# Patient Record
Sex: FEMALE | Race: WHITE | URBAN - METROPOLITAN AREA
[De-identification: names, ages, dates, MRNs, and addresses within clinical notes are randomized per-mention and may not be internally consistent; named-entity substitution may affect disease eponyms.]

---

## 2019-01-29 ENCOUNTER — DOCTOR'S OFFICE (OUTPATIENT)
Dept: URBAN - METROPOLITAN AREA CLINIC 136 | Facility: CLINIC | Age: 49
Setting detail: OPHTHALMOLOGY
End: 2019-01-29
Payer: COMMERCIAL

## 2019-01-29 ENCOUNTER — OPTICAL OFFICE (OUTPATIENT)
Dept: URBAN - METROPOLITAN AREA CLINIC 143 | Facility: CLINIC | Age: 49
Setting detail: OPHTHALMOLOGY
End: 2019-01-29
Payer: COMMERCIAL

## 2019-01-29 DIAGNOSIS — H52.223: ICD-10-CM

## 2019-01-29 DIAGNOSIS — H52.4: ICD-10-CM

## 2019-01-29 PROCEDURE — 92310 CONTACT LENS FITTING OU: CPT | Performed by: OPTOMETRIST

## 2019-01-29 PROCEDURE — V2760 SCRATCH RESISTANT COATING: HCPCS | Performed by: OPTOMETRIST

## 2019-01-29 PROCEDURE — V2020 VISION SVCS FRAMES PURCHASES: HCPCS | Performed by: OPTOMETRIST

## 2019-01-29 PROCEDURE — 92015 DETERMINE REFRACTIVE STATE: CPT | Performed by: OPTOMETRIST

## 2019-01-29 PROCEDURE — V2107 SPHEROCYLINDER 4.25D/12-2D: HCPCS | Performed by: OPTOMETRIST

## 2019-01-29 PROCEDURE — V2782 LENS, 1.54-1.65 P/1.60-1.79G: HCPCS | Performed by: OPTOMETRIST

## 2019-01-29 PROCEDURE — V2104 SPHEROCYLINDR 4.00D/2.12-4D: HCPCS | Performed by: OPTOMETRIST

## 2019-01-29 PROCEDURE — 92004 COMPRE OPH EXAM NEW PT 1/>: CPT | Performed by: OPTOMETRIST

## 2019-01-29 ASSESSMENT — REFRACTION_AUTOREFRACTION
OS_CYLINDER: -1.00
OD_CYLINDER: -3.25
OS_AXIS: 101
OD_SPHERE: -3.75
OS_SPHERE: -5.00
OD_AXIS: 017

## 2019-01-29 ASSESSMENT — REFRACTION_MANIFEST
OD_VA2: 20/
OS_VA1: 20/40+
OD_SPHERE: -3.25
OD_CYLINDER: -3.00
OD_VA3: 20/
OD_VA3: 20/
OU_VA: 20/30
OD_AXIS: 020
OS_CYLINDER: -0.50
OS_VA2: 20/
OS_AXIS: 180
OD_ADD: +2.00
OS_VA3: 20/
OU_VA: 20/
OS_VA3: 20/
OS_SPHERE: -5.00
OD_VA2: 20/
OS_ADD: +2.00
OS_VA2: 20/
OS_VA1: 20/
OD_VA1: 20/
OD_VA1: 20/25

## 2019-01-29 ASSESSMENT — VISUAL ACUITY
OD_BCVA: 20/50-2
OS_BCVA: 20/40-2

## 2019-01-29 ASSESSMENT — KERATOMETRY
OD_K1POWER_DIOPTERS: 44.00
OS_AXISANGLE_DEGREES: 062
OD_AXISANGLE_DEGREES: 093
METHOD_AUTO_MANUAL: AUTO
OS_K2POWER_DIOPTERS: 45.25
OD_K2POWER_DIOPTERS: 49.25
OS_K1POWER_DIOPTERS: 44.75

## 2019-01-29 ASSESSMENT — SPHEQUIV_DERIVED
OS_SPHEQUIV: -5.25
OS_SPHEQUIV: -5.5
OD_SPHEQUIV: -4.75
OD_SPHEQUIV: -5.375

## 2019-01-29 ASSESSMENT — AXIALLENGTH_DERIVED
OD_AL: 24.5777
OS_AL: 25.1874
OS_AL: 25.2989
OD_AL: 24.3163

## 2019-01-29 ASSESSMENT — CONFRONTATIONAL VISUAL FIELD TEST (CVF)
OS_FINDINGS: FULL
OD_FINDINGS: FULL

## 2019-01-29 ASSESSMENT — REFRACTION_CURRENTRX
OD_OVR_VA: 20/
OS_OVR_VA: 20/
OS_OVR_VA: 20/
OD_OVR_VA: 20/
OD_OVR_VA: 20/
OS_OVR_VA: 20/

## 2019-02-21 ENCOUNTER — DOCTOR'S OFFICE (OUTPATIENT)
Dept: URBAN - METROPOLITAN AREA CLINIC 136 | Facility: CLINIC | Age: 49
Setting detail: OPHTHALMOLOGY
End: 2019-02-21

## 2019-02-21 DIAGNOSIS — H52.223: ICD-10-CM

## 2019-02-21 PROCEDURE — CLFUP CONTACT LENS FOLLOW-UP: Performed by: OPTOMETRIST

## 2019-02-21 ASSESSMENT — REFRACTION_MANIFEST
OD_VA2: 20/
OS_VA1: 20/
OD_VA2: 20/
OD_VA1: 20/
OU_VA: 20/
OD_ADD: +2.00
OS_SPHERE: -5.00
OS_CYLINDER: -0.50
OD_VA3: 20/
OS_ADD: +2.00
OU_VA: 20/30
OS_VA2: 20/
OD_CYLINDER: -3.00
OS_AXIS: 180
OD_VA1: 20/25
OS_VA1: 20/40+
OD_SPHERE: -3.25
OS_VA2: 20/
OS_VA3: 20/
OS_VA3: 20/
OD_VA3: 20/
OD_AXIS: 020

## 2019-02-21 ASSESSMENT — REFRACTION_AUTOREFRACTION
OS_CYLINDER: -1.00
OD_SPHERE: -3.75
OD_CYLINDER: -3.25
OD_AXIS: 017
OS_AXIS: 101
OS_SPHERE: -5.00

## 2019-02-21 ASSESSMENT — SPHEQUIV_DERIVED
OD_SPHEQUIV: -5.375
OS_SPHEQUIV: -5.25
OS_SPHEQUIV: -5.5
OD_SPHEQUIV: -4.75

## 2019-02-21 ASSESSMENT — REFRACTION_CURRENTRX
OS_OVR_VA: 20/
OD_OVR_VA: 20/
OS_OVR_VA: 20/
OD_OVR_VA: 20/
OS_OVR_VA: 20/
OD_OVR_VA: 20/

## 2019-02-21 ASSESSMENT — VISUAL ACUITY
OS_BCVA: 20/40+2
OD_BCVA: 20/50+2

## 2019-02-21 ASSESSMENT — CONFRONTATIONAL VISUAL FIELD TEST (CVF)
OS_FINDINGS: FULL
OD_FINDINGS: FULL

## 2019-03-07 ENCOUNTER — OPTICAL OFFICE (OUTPATIENT)
Dept: URBAN - METROPOLITAN AREA CLINIC 143 | Facility: CLINIC | Age: 49
Setting detail: OPHTHALMOLOGY
End: 2019-03-07

## 2019-03-07 DIAGNOSIS — H52.13: ICD-10-CM

## 2019-03-07 PROCEDURE — S0500 DISPOS CONT LENS: HCPCS | Performed by: OPTOMETRIST

## 2019-10-19 ENCOUNTER — APPOINTMENT (EMERGENCY)
Dept: RADIOLOGY | Facility: HOSPITAL | Age: 49
End: 2019-10-19
Payer: OTHER MISCELLANEOUS

## 2019-10-19 ENCOUNTER — HOSPITAL ENCOUNTER (EMERGENCY)
Facility: HOSPITAL | Age: 49
Discharge: HOME | End: 2019-10-19
Attending: EMERGENCY MEDICINE | Admitting: EMERGENCY MEDICINE
Payer: OTHER MISCELLANEOUS

## 2019-10-19 VITALS
TEMPERATURE: 97.6 F | HEART RATE: 90 BPM | BODY MASS INDEX: 42.11 KG/M2 | SYSTOLIC BLOOD PRESSURE: 154 MMHG | DIASTOLIC BLOOD PRESSURE: 87 MMHG | OXYGEN SATURATION: 97 % | HEIGHT: 66 IN | WEIGHT: 262 LBS

## 2019-10-19 DIAGNOSIS — S46.212A BICEPS STRAIN, LEFT, INITIAL ENCOUNTER: Primary | ICD-10-CM

## 2019-10-19 PROCEDURE — 73070 X-RAY EXAM OF ELBOW: CPT

## 2019-10-19 PROCEDURE — 99284 EMERGENCY DEPT VISIT MOD MDM: CPT | Performed by: EMERGENCY MEDICINE

## 2019-10-19 PROCEDURE — 99283 EMERGENCY DEPT VISIT LOW MDM: CPT

## 2019-10-19 RX ORDER — ACETAMINOPHEN 325 MG/1
650 TABLET ORAL ONCE
Status: COMPLETED | OUTPATIENT
Start: 2019-10-19 | End: 2019-10-19

## 2019-10-19 RX ORDER — IBUPROFEN 600 MG/1
600 TABLET ORAL ONCE
Status: DISCONTINUED | OUTPATIENT
Start: 2019-10-19 | End: 2019-10-19

## 2019-10-19 RX ADMIN — ACETAMINOPHEN 650 MG: 325 TABLET ORAL at 18:25

## 2019-10-19 NOTE — ED PROVIDER NOTES
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 when the pt "took a hold of my arm ( left) and pulled herself up, then I felt instant pain in my elbow (left)"      Massimo Galeana is a 47y o  year old female presenting to the [facility] ED for left elbow pain  Patient patient was at work when a nursing home resident tried to pull herself up by pulling on the patient's flexed elbow  Patient had immediate discomfort at the lateral aspect of the elbow  Patient applied ice to the area but did not take any over-the-counter analgesia  Patient states she has a "heaviness" in her left upper extremity  No bony tenderness in the left forearm or left humerus  Patient denies fever, lightheadedness, chest pain, dyspnea, hemoptysis, LE pain/edema  History provided by:  Patient   used: No    Arm Pain   Location:  Left elbow  Quality:  Heaviness  Severity:  Mild  Onset quality:  Sudden  Timing:  Intermittent  Progression:  Partially resolved  Chronicity:  New  Ineffective treatments: Ice  Associated symptoms: no abdominal pain, no chest pain, no congestion, no cough, no diarrhea, no fever, no nausea, no rash, no rhinorrhea, no shortness of breath, no vomiting and no wheezing        None       Past Medical History:   Diagnosis Date    Diabetes mellitus ([facility] [address] )     Hyperlipidemia     Hypertension        Past Surgical History:   Procedure Laterality Date    EYE SURGERY      HIP RESURFACING      KNEE ARTHROPLASTY      SHOULDER ARTHROSCOPY W/ ROTATOR CUFF REPAIR         History reviewed  No pertinent family history  I have reviewed and agree with the history as documented      Social History     Tobacco Use    Smoking status: Former Smoker     Last attempt to quit: 10/19/2016     Years since quitting: 3 0    Smokeless tobacco: Never Used   Substance Use Topics    Alcohol use: Not Currently     Frequency: Never    Drug use: Never        Review of Systems   Constitutional: Negative for chills and fever  HENT: Negative for congestion and rhinorrhea  Eyes: Negative for photophobia and visual disturbance  Respiratory: Negative for cough, choking, chest tightness, shortness of breath and wheezing  Cardiovascular: Negative for chest pain and leg swelling  Gastrointestinal: Negative for abdominal distention, abdominal pain, constipation, diarrhea, nausea and vomiting  Endocrine: Negative for polyuria  Genitourinary: Negative for difficulty urinating, dysuria, flank pain and hematuria  Musculoskeletal: Positive for arthralgias  Negative for back pain  Skin: Negative for rash  Neurological: Negative for seizures, syncope and light-headedness  Psychiatric/Behavioral: Negative for behavioral problems and confusion  All other systems reviewed and are negative  Physical Exam  ED Triage Vitals [10/19/19 1755]   Temperature Pulse Resp Blood Pressure SpO2   97 6 °F (36 4 °C) 90 -- 154/87 97 %      Temp Source Heart Rate Source Patient Position - Orthostatic VS BP Location FiO2 (%)   Oral Monitor -- Right arm --      Pain Score       8             Orthostatic Vital Signs  Vitals:    10/19/19 1755   BP: 154/87   Pulse: 90       Physical Exam   Constitutional: She is oriented to person, place, and time  She appears well-developed and well-nourished  Non-toxic appearance  She does not appear ill  No distress  HENT:   Head: Normocephalic and atraumatic  Neck: Neck supple  Cardiovascular: Normal rate, regular rhythm and intact distal pulses  Pulses:       Radial pulses are 2+ on the right side, and 2+ on the left side  Pulmonary/Chest: Effort normal and breath sounds normal  No accessory muscle usage or stridor  No respiratory distress  She has no decreased breath sounds  She has no wheezes  She has no rhonchi  She has no rales  Abdominal: Soft  Bowel sounds are normal  She exhibits no distension   There is no tenderness  There is no rebound and no guarding  Musculoskeletal:        Left hand: Normal sensation noted  Normal strength noted  Right lower leg: She exhibits no tenderness and no edema  Left lower leg: She exhibits no tenderness and no edema  Median, ulnar, radial nerve function intact LUE   Neurological: She is alert and oriented to person, place, and time  Skin: Capillary refill takes less than 2 seconds  No rash noted  She is not diaphoretic  Psychiatric: She has a normal mood and affect  Her behavior is normal    Nursing note and vitals reviewed  ED Medications  Medications   acetaminophen (TYLENOL) tablet 650 mg (650 mg Oral Given 10/19/19 1545)       Diagnostic Studies  Results Reviewed     None                 XR elbow 2 vw LEFT    (Results Pending)         Procedures  Procedures        ED Course                               MDM  Number of Diagnoses or Management Options  Biceps strain, left, initial encounter:   Diagnosis management comments: 47 y o  Female presenting for left elbow pain  Tenderness with supination of the left arm  No bony tenderness  Symptoms consistent with musculoskeletal strain  X-ray: No acute fracture or dislocation  Instructed patient to take Tylenol for symptoms  Patient instructed to follow up with occupational medicine in 3-5 days  RTED precautions given including weakness, tingling, persistent pain  I have discussed with the patient our plan to discharge them from the ED and the patient is in agreement with this plan  I have educated the patient on pertinent lab/imaging results and answered their questions  Return to the ED precautions given  I have also discussed with the patient plans for follow up with occupational medicine             Amount and/or Complexity of Data Reviewed  Tests in the radiology section of CPT®: ordered and reviewed    Patient Progress  Patient progress: stable      Disposition  Final diagnoses:   Biceps strain, left, initial encounter     Time reflects when diagnosis was documented in both MDM as applicable and the Disposition within this note     Time User Action Codes Description Comment    10/19/2019  6:22 PM Verneda Sandra Biceps strain, left, initial encounter       ED Disposition     ED Disposition Condition Date/Time Comment    Discharge Stable Sat Oct 19, 2019  6:18 PM Ethel Chang discharge to home/self care  Follow-up Information     Follow up With Specialties Details Why [facility]  Schedule an appointment as soon as possible for a visit  To make appointment for reevaluation in 3-5 days  [address] [id number]  [phone]          There are no discharge medications for this patient  No discharge procedures on file  ED Provider  Attending physically available and evaluated Ethel Chang I managed the patient along with the ED Attending      Electronically Signed by DO Sharita Lewis DO  10/20/19 2169

## 2019-10-19 NOTE — DISCHARGE INSTRUCTIONS
You have been seen for left elbow pain  Your symptoms are consistent with a musculoskeletal strain  You can take Tylenol and apply ice for discomfort  Please follow up with occupational medicine for further management

## 2019-10-20 NOTE — ED ATTENDING ATTESTATION
10/19/2019  I, Lorenzo Smith MD, saw and evaluated the patient  I have discussed the patient with the resident/non-physician practitioner and agree with the resident's/non-physician practitioner's findings, Plan of Care, and MDM as documented in the resident's/non-physician practitioner's note, except where noted  All available labs and Radiology studies were reviewed  I was present for key portions of any procedure(s) performed by the resident/non-physician practitioner and I was immediately available to provide assistance  At this point I agree with the current assessment done in the Emergency Department  I have conducted an independent evaluation of this patient a history and physical is as follows:     11-year-old presenting to ER with left elbow pain  Works in a nursing home,  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

## 2019-10-21 ENCOUNTER — APPOINTMENT (OUTPATIENT)
Dept: URGENT CARE | Age: 49
End: 2019-10-21
Payer: OTHER MISCELLANEOUS

## 2019-10-21 PROCEDURE — 99213 OFFICE O/P EST LOW 20 MIN: CPT | Performed by: PHYSICIAN ASSISTANT

## 2019-10-31 ENCOUNTER — APPOINTMENT (OUTPATIENT)
Dept: URGENT CARE | Age: 49
End: 2019-10-31
Payer: OTHER MISCELLANEOUS

## 2019-10-31 PROCEDURE — 99213 OFFICE O/P EST LOW 20 MIN: CPT | Performed by: PREVENTIVE MEDICINE

## 2020-06-01 ENCOUNTER — DOCTOR'S OFFICE (OUTPATIENT)
Dept: URBAN - METROPOLITAN AREA CLINIC 136 | Facility: CLINIC | Age: 50
Setting detail: OPHTHALMOLOGY
End: 2020-06-01
Payer: COMMERCIAL

## 2020-06-01 DIAGNOSIS — H25.013: ICD-10-CM

## 2020-06-01 DIAGNOSIS — E10.3593: ICD-10-CM

## 2020-06-01 DIAGNOSIS — H43.11: ICD-10-CM

## 2020-06-01 DIAGNOSIS — E11.3513: ICD-10-CM

## 2020-06-01 PROCEDURE — 92014 COMPRE OPH EXAM EST PT 1/>: CPT | Performed by: OPHTHALMOLOGY

## 2020-06-01 ASSESSMENT — REFRACTION_MANIFEST
OS_AXIS: 180
OD_AXIS: 020
OD_CYLINDER: -3.00
OS_CYLINDER: -0.50
OU_VA: 20/30
OD_SPHERE: -3.25
OD_VA1: 20/25
OS_ADD: +2.00
OD_ADD: +2.00
OS_VA1: 20/40+
OS_SPHERE: -5.00

## 2020-06-01 ASSESSMENT — SPHEQUIV_DERIVED
OD_SPHEQUIV: -4.75
OS_SPHEQUIV: -5.25
OD_SPHEQUIV: -5.375
OS_SPHEQUIV: -5.5

## 2020-06-01 ASSESSMENT — KERATOMETRY
OD_K2POWER_DIOPTERS: 49.25
METHOD_AUTO_MANUAL: AUTO
OS_K2POWER_DIOPTERS: 45.25
OD_AXISANGLE_DEGREES: 093
OS_K1POWER_DIOPTERS: 44.75
OS_AXISANGLE_DEGREES: 062
OD_K1POWER_DIOPTERS: 44.00

## 2020-06-01 ASSESSMENT — CONFRONTATIONAL VISUAL FIELD TEST (CVF)
OS_FINDINGS: FULL
OD_FINDINGS: FULL

## 2020-06-01 ASSESSMENT — AXIALLENGTH_DERIVED
OD_AL: 24.5777
OD_AL: 24.3163
OS_AL: 25.2989
OS_AL: 25.1874

## 2020-06-01 ASSESSMENT — VISUAL ACUITY
OD_BCVA: 20/70-1
OS_BCVA: 20/50-1

## 2020-06-01 ASSESSMENT — REFRACTION_AUTOREFRACTION
OD_AXIS: 017
OS_SPHERE: -5.00
OS_CYLINDER: -1.00
OD_CYLINDER: -3.25
OS_AXIS: 101
OD_SPHERE: -3.75

## 2020-06-04 ENCOUNTER — DOCTOR'S OFFICE (OUTPATIENT)
Dept: URBAN - METROPOLITAN AREA CLINIC 136 | Facility: CLINIC | Age: 50
Setting detail: OPHTHALMOLOGY
End: 2020-06-04
Payer: COMMERCIAL

## 2020-06-04 DIAGNOSIS — E11.3513: ICD-10-CM

## 2020-06-04 DIAGNOSIS — H43.11: ICD-10-CM

## 2020-06-04 DIAGNOSIS — H25.013: ICD-10-CM

## 2020-06-04 PROCEDURE — 92134 CPTRZ OPH DX IMG PST SGM RTA: CPT | Performed by: OPHTHALMOLOGY

## 2020-06-04 PROCEDURE — 92014 COMPRE OPH EXAM EST PT 1/>: CPT | Performed by: OPHTHALMOLOGY

## 2020-06-04 ASSESSMENT — SPHEQUIV_DERIVED
OS_SPHEQUIV: -5.25
OD_SPHEQUIV: -4.75
OD_SPHEQUIV: -5.375
OS_SPHEQUIV: -5.5

## 2020-06-04 ASSESSMENT — KERATOMETRY
OS_K2POWER_DIOPTERS: 45.25
METHOD_AUTO_MANUAL: AUTO
OS_K1POWER_DIOPTERS: 44.75
OS_AXISANGLE_DEGREES: 062
OD_AXISANGLE_DEGREES: 093
OD_K1POWER_DIOPTERS: 44.00
OD_K2POWER_DIOPTERS: 49.25

## 2020-06-04 ASSESSMENT — REFRACTION_AUTOREFRACTION
OS_AXIS: 101
OD_AXIS: 017
OS_SPHERE: -5.00
OD_SPHERE: -3.75
OD_CYLINDER: -3.25
OS_CYLINDER: -1.00

## 2020-06-04 ASSESSMENT — VISUAL ACUITY
OD_BCVA: 20/60+2
OS_BCVA: 20/50-1

## 2020-06-04 ASSESSMENT — REFRACTION_MANIFEST
OD_AXIS: 020
OS_AXIS: 180
OD_ADD: +2.00
OD_CYLINDER: -3.00
OD_VA1: 20/25
OU_VA: 20/30
OS_SPHERE: -5.00
OS_VA1: 20/40+
OS_ADD: +2.00
OD_SPHERE: -3.25
OS_CYLINDER: -0.50

## 2020-06-04 ASSESSMENT — AXIALLENGTH_DERIVED
OS_AL: 25.1874
OD_AL: 24.3163
OS_AL: 25.2989
OD_AL: 24.5777

## 2020-06-04 ASSESSMENT — CONFRONTATIONAL VISUAL FIELD TEST (CVF)
OD_FINDINGS: FULL
OS_FINDINGS: FULL

## 2020-06-25 ENCOUNTER — DOCTOR'S OFFICE (OUTPATIENT)
Dept: URBAN - METROPOLITAN AREA CLINIC 136 | Facility: CLINIC | Age: 50
Setting detail: OPHTHALMOLOGY
End: 2020-06-25
Payer: COMMERCIAL

## 2020-06-25 DIAGNOSIS — E11.3511: ICD-10-CM

## 2020-06-25 DIAGNOSIS — E11.3513: ICD-10-CM

## 2020-06-25 PROCEDURE — 67028 INJECTION EYE DRUG: CPT | Performed by: OPHTHALMOLOGY

## 2020-06-25 PROCEDURE — 92250 FUNDUS PHOTOGRAPHY W/I&R: CPT | Performed by: OPHTHALMOLOGY

## 2020-06-25 PROCEDURE — 92235 FLUORESCEIN ANGRPH MLTIFRAME: CPT | Performed by: OPHTHALMOLOGY

## 2020-06-25 ASSESSMENT — SPHEQUIV_DERIVED
OS_SPHEQUIV: -5.5
OD_SPHEQUIV: -5.375

## 2020-06-25 ASSESSMENT — REFRACTION_AUTOREFRACTION
OS_SPHERE: -5.00
OS_AXIS: 101
OS_CYLINDER: -1.00
OD_AXIS: 017
OD_SPHERE: -3.75
OD_CYLINDER: -3.25

## 2020-06-25 ASSESSMENT — KERATOMETRY
OS_K1POWER_DIOPTERS: 44.75
OS_K2POWER_DIOPTERS: 45.25
OD_K2POWER_DIOPTERS: 49.25
OD_AXISANGLE_DEGREES: 093
METHOD_AUTO_MANUAL: AUTO
OS_AXISANGLE_DEGREES: 062
OD_K1POWER_DIOPTERS: 44.00

## 2020-06-25 ASSESSMENT — VISUAL ACUITY
OS_BCVA: CF @ 5FT
OD_BCVA: 20/70

## 2020-06-25 ASSESSMENT — AXIALLENGTH_DERIVED
OS_AL: 25.2989
OD_AL: 24.5777

## 2020-07-09 ENCOUNTER — DOCTOR'S OFFICE (OUTPATIENT)
Dept: URBAN - METROPOLITAN AREA CLINIC 136 | Facility: CLINIC | Age: 50
Setting detail: OPHTHALMOLOGY
End: 2020-07-09
Payer: COMMERCIAL

## 2020-07-09 DIAGNOSIS — E11.3512: ICD-10-CM

## 2020-07-09 DIAGNOSIS — E11.3513: ICD-10-CM

## 2020-07-09 PROCEDURE — 67028 INJECTION EYE DRUG: CPT | Performed by: OPHTHALMOLOGY

## 2020-07-09 PROCEDURE — 92134 CPTRZ OPH DX IMG PST SGM RTA: CPT | Performed by: OPHTHALMOLOGY

## 2020-07-09 ASSESSMENT — KERATOMETRY
OD_K2POWER_DIOPTERS: 49.25
OD_AXISANGLE_DEGREES: 093
OS_AXISANGLE_DEGREES: 062
METHOD_AUTO_MANUAL: AUTO
OS_K2POWER_DIOPTERS: 45.25
OD_K1POWER_DIOPTERS: 44.00
OS_K1POWER_DIOPTERS: 44.75

## 2020-07-09 ASSESSMENT — AXIALLENGTH_DERIVED
OS_AL: 25.2989
OD_AL: 24.5777

## 2020-07-09 ASSESSMENT — SPHEQUIV_DERIVED
OS_SPHEQUIV: -5.5
OD_SPHEQUIV: -5.375

## 2020-07-09 ASSESSMENT — REFRACTION_AUTOREFRACTION
OS_AXIS: 101
OS_SPHERE: -5.00
OD_AXIS: 017
OD_CYLINDER: -3.25
OD_SPHERE: -3.75
OS_CYLINDER: -1.00

## 2020-07-09 ASSESSMENT — VISUAL ACUITY
OD_BCVA: 20/60-1
OS_BCVA: 20/125

## 2020-07-23 ENCOUNTER — RX ONLY (RX ONLY)
Age: 50
End: 2020-07-23

## 2020-07-23 ENCOUNTER — DOCTOR'S OFFICE (OUTPATIENT)
Dept: URBAN - METROPOLITAN AREA CLINIC 136 | Facility: CLINIC | Age: 50
Setting detail: OPHTHALMOLOGY
End: 2020-07-23
Payer: COMMERCIAL

## 2020-07-23 DIAGNOSIS — H43.11: ICD-10-CM

## 2020-07-23 DIAGNOSIS — E11.3513: ICD-10-CM

## 2020-07-23 DIAGNOSIS — E11.3511: ICD-10-CM

## 2020-07-23 PROCEDURE — 92134 CPTRZ OPH DX IMG PST SGM RTA: CPT | Performed by: OPHTHALMOLOGY

## 2020-07-23 PROCEDURE — 67028 INJECTION EYE DRUG: CPT | Performed by: OPHTHALMOLOGY

## 2020-07-23 PROCEDURE — 92012 INTRM OPH EXAM EST PATIENT: CPT | Performed by: OPHTHALMOLOGY

## 2020-07-23 ASSESSMENT — KERATOMETRY
OD_K2POWER_DIOPTERS: 49.25
OD_K1POWER_DIOPTERS: 44.00
OS_AXISANGLE_DEGREES: 062
OS_K1POWER_DIOPTERS: 44.75
METHOD_AUTO_MANUAL: AUTO
OS_K2POWER_DIOPTERS: 45.25
OD_AXISANGLE_DEGREES: 093

## 2020-07-23 ASSESSMENT — SPHEQUIV_DERIVED
OD_SPHEQUIV: -5.375
OS_SPHEQUIV: -5.5

## 2020-07-23 ASSESSMENT — REFRACTION_AUTOREFRACTION
OD_CYLINDER: -3.25
OD_SPHERE: -3.75
OS_CYLINDER: -1.00
OS_SPHERE: -5.00
OS_AXIS: 101
OD_AXIS: 017

## 2020-07-23 ASSESSMENT — CONFRONTATIONAL VISUAL FIELD TEST (CVF)
OS_FINDINGS: FULL
OD_FINDINGS: FULL

## 2020-07-23 ASSESSMENT — VISUAL ACUITY
OD_BCVA: 20/60
OS_BCVA: 20/125-1

## 2020-07-23 ASSESSMENT — AXIALLENGTH_DERIVED
OS_AL: 25.2989
OD_AL: 24.5777

## 2020-08-06 ENCOUNTER — DOCTOR'S OFFICE (OUTPATIENT)
Dept: URBAN - METROPOLITAN AREA CLINIC 136 | Facility: CLINIC | Age: 50
Setting detail: OPHTHALMOLOGY
End: 2020-08-06
Payer: COMMERCIAL

## 2020-08-06 ENCOUNTER — RX ONLY (RX ONLY)
Age: 50
End: 2020-08-06

## 2020-08-06 DIAGNOSIS — E11.3513: ICD-10-CM

## 2020-08-06 DIAGNOSIS — E11.3512: ICD-10-CM

## 2020-08-06 PROCEDURE — 67028 INJECTION EYE DRUG: CPT | Performed by: OPHTHALMOLOGY

## 2020-08-06 PROCEDURE — 92134 CPTRZ OPH DX IMG PST SGM RTA: CPT | Performed by: OPHTHALMOLOGY

## 2020-08-06 ASSESSMENT — KERATOMETRY
OD_K2POWER_DIOPTERS: 49.25
OD_AXISANGLE_DEGREES: 093
OD_K1POWER_DIOPTERS: 44.00
OS_AXISANGLE_DEGREES: 062
METHOD_AUTO_MANUAL: AUTO
OS_K1POWER_DIOPTERS: 44.75
OS_K2POWER_DIOPTERS: 45.25

## 2020-08-06 ASSESSMENT — REFRACTION_AUTOREFRACTION
OD_CYLINDER: -3.25
OD_AXIS: 017
OD_SPHERE: -3.75
OS_AXIS: 101
OS_CYLINDER: -1.00
OS_SPHERE: -5.00

## 2020-08-06 ASSESSMENT — AXIALLENGTH_DERIVED
OD_AL: 24.5777
OS_AL: 25.2989

## 2020-08-06 ASSESSMENT — SPHEQUIV_DERIVED
OD_SPHEQUIV: -5.375
OS_SPHEQUIV: -5.5

## 2020-08-06 ASSESSMENT — VISUAL ACUITY
OS_BCVA: 20/50-1
OD_BCVA: 20/40-2

## 2020-08-21 ENCOUNTER — AMBUL SURGICAL CARE (OUTPATIENT)
Dept: URBAN - METROPOLITAN AREA SURGERY 32 | Facility: SURGERY | Age: 50
Setting detail: OPHTHALMOLOGY
End: 2020-08-21
Payer: COMMERCIAL

## 2020-08-21 DIAGNOSIS — E11.3511: ICD-10-CM

## 2020-08-21 PROCEDURE — G8907 PT DOC NO EVENTS ON DISCHARG: HCPCS | Performed by: OPHTHALMOLOGY

## 2020-08-21 PROCEDURE — G8918 PT W/O PREOP ORDER IV AB PRO: HCPCS | Performed by: OPHTHALMOLOGY

## 2020-08-21 PROCEDURE — 67228 TREATMENT X10SV RETINOPATHY: CPT | Performed by: OPHTHALMOLOGY

## 2020-09-03 ENCOUNTER — DOCTOR'S OFFICE (OUTPATIENT)
Dept: URBAN - METROPOLITAN AREA CLINIC 136 | Facility: CLINIC | Age: 50
Setting detail: OPHTHALMOLOGY
End: 2020-09-03
Payer: COMMERCIAL

## 2020-09-03 DIAGNOSIS — E11.3513: ICD-10-CM

## 2020-09-03 DIAGNOSIS — E11.3512: ICD-10-CM

## 2020-09-03 PROCEDURE — 67028 INJECTION EYE DRUG: CPT | Performed by: OPHTHALMOLOGY

## 2020-09-03 PROCEDURE — 92134 CPTRZ OPH DX IMG PST SGM RTA: CPT | Performed by: OPHTHALMOLOGY

## 2020-09-03 ASSESSMENT — REFRACTION_AUTOREFRACTION
OS_AXIS: 101
OD_AXIS: 017
OS_CYLINDER: -1.00
OD_SPHERE: -3.75
OS_SPHERE: -5.00
OD_CYLINDER: -3.25

## 2020-09-03 ASSESSMENT — KERATOMETRY
OD_K2POWER_DIOPTERS: 49.25
OS_K1POWER_DIOPTERS: 44.75
OS_AXISANGLE_DEGREES: 062
OD_AXISANGLE_DEGREES: 093
OS_K2POWER_DIOPTERS: 45.25
METHOD_AUTO_MANUAL: AUTO
OD_K1POWER_DIOPTERS: 44.00

## 2020-09-03 ASSESSMENT — AXIALLENGTH_DERIVED
OD_AL: 24.5777
OS_AL: 25.2989

## 2020-09-03 ASSESSMENT — VISUAL ACUITY
OS_BCVA: 20/50+1
OD_BCVA: 20/50-2

## 2020-09-03 ASSESSMENT — SPHEQUIV_DERIVED
OS_SPHEQUIV: -5.5
OD_SPHEQUIV: -5.375

## 2020-09-03 ASSESSMENT — CONFRONTATIONAL VISUAL FIELD TEST (CVF)
OS_FINDINGS: FULL
OD_FINDINGS: FULL

## 2020-10-01 ENCOUNTER — DOCTOR'S OFFICE (OUTPATIENT)
Dept: URBAN - METROPOLITAN AREA CLINIC 136 | Facility: CLINIC | Age: 50
Setting detail: OPHTHALMOLOGY
End: 2020-10-01
Payer: COMMERCIAL

## 2020-10-01 DIAGNOSIS — H25.013: ICD-10-CM

## 2020-10-01 DIAGNOSIS — H43.11: ICD-10-CM

## 2020-10-01 DIAGNOSIS — E11.3512: ICD-10-CM

## 2020-10-01 DIAGNOSIS — E11.3513: ICD-10-CM

## 2020-10-01 PROCEDURE — 67028 INJECTION EYE DRUG: CPT | Performed by: OPHTHALMOLOGY

## 2020-10-01 PROCEDURE — 92012 INTRM OPH EXAM EST PATIENT: CPT | Performed by: OPHTHALMOLOGY

## 2020-10-01 PROCEDURE — 92134 CPTRZ OPH DX IMG PST SGM RTA: CPT | Performed by: OPHTHALMOLOGY

## 2020-10-01 ASSESSMENT — KERATOMETRY
OS_K1POWER_DIOPTERS: 44.75
OD_K1POWER_DIOPTERS: 44.00
METHOD_AUTO_MANUAL: AUTO
OS_K2POWER_DIOPTERS: 45.25
OD_K2POWER_DIOPTERS: 49.25
OD_AXISANGLE_DEGREES: 093
OS_AXISANGLE_DEGREES: 062

## 2020-10-01 ASSESSMENT — REFRACTION_AUTOREFRACTION
OD_CYLINDER: -3.25
OS_SPHERE: -5.00
OS_CYLINDER: -1.00
OS_AXIS: 101
OD_AXIS: 017
OD_SPHERE: -3.75

## 2020-10-01 ASSESSMENT — SPHEQUIV_DERIVED
OS_SPHEQUIV: -5.5
OD_SPHEQUIV: -5.375

## 2020-10-01 ASSESSMENT — CONFRONTATIONAL VISUAL FIELD TEST (CVF)
OS_FINDINGS: FULL
OD_FINDINGS: FULL

## 2020-10-01 ASSESSMENT — VISUAL ACUITY
OD_BCVA: 20/60+2
OS_BCVA: 20/50+1

## 2020-10-01 ASSESSMENT — AXIALLENGTH_DERIVED
OD_AL: 24.5777
OS_AL: 25.2989

## 2020-10-29 ENCOUNTER — DOCTOR'S OFFICE (OUTPATIENT)
Dept: URBAN - METROPOLITAN AREA CLINIC 136 | Facility: CLINIC | Age: 50
Setting detail: OPHTHALMOLOGY
End: 2020-10-29
Payer: COMMERCIAL

## 2020-10-29 DIAGNOSIS — E11.3511: ICD-10-CM

## 2020-10-29 DIAGNOSIS — H25.013: ICD-10-CM

## 2020-10-29 DIAGNOSIS — E11.3512: ICD-10-CM

## 2020-10-29 DIAGNOSIS — E11.3513: ICD-10-CM

## 2020-10-29 DIAGNOSIS — H43.11: ICD-10-CM

## 2020-10-29 PROBLEM — H52.223 ASTIGMATISM, REGULAR; BOTH EYES: Status: ACTIVE | Noted: 2019-01-29

## 2020-10-29 PROCEDURE — 92134 CPTRZ OPH DX IMG PST SGM RTA: CPT | Performed by: OPHTHALMOLOGY

## 2020-10-29 PROCEDURE — 92012 INTRM OPH EXAM EST PATIENT: CPT | Performed by: OPHTHALMOLOGY

## 2020-10-29 PROCEDURE — 67028 INJECTION EYE DRUG: CPT | Performed by: OPHTHALMOLOGY

## 2020-10-29 ASSESSMENT — CONFRONTATIONAL VISUAL FIELD TEST (CVF)
OS_FINDINGS: FULL
OD_FINDINGS: FULL

## 2020-10-29 ASSESSMENT — TONOMETRY
OS_IOP_MMHG: 12
OD_IOP_MMHG: 13

## 2020-10-30 ENCOUNTER — RX ONLY (RX ONLY)
Age: 50
End: 2020-10-30

## 2020-10-30 ASSESSMENT — SPHEQUIV_DERIVED
OD_SPHEQUIV: -5.375
OS_SPHEQUIV: -5.5

## 2020-10-30 ASSESSMENT — KERATOMETRY
OD_K1POWER_DIOPTERS: 44.00
OS_K1POWER_DIOPTERS: 44.75
OS_K2POWER_DIOPTERS: 45.25
OD_K2POWER_DIOPTERS: 49.25
OD_AXISANGLE_DEGREES: 093
OS_AXISANGLE_DEGREES: 062
METHOD_AUTO_MANUAL: AUTO

## 2020-10-30 ASSESSMENT — REFRACTION_AUTOREFRACTION
OS_SPHERE: -5.00
OS_AXIS: 101
OD_AXIS: 017
OS_CYLINDER: -1.00
OD_SPHERE: -3.75
OD_CYLINDER: -3.25

## 2020-10-30 ASSESSMENT — AXIALLENGTH_DERIVED
OD_AL: 24.5777
OS_AL: 25.2989

## 2020-10-30 ASSESSMENT — VISUAL ACUITY
OS_BCVA: 20/40-2
OD_BCVA: 20/50+2

## 2020-11-01 ENCOUNTER — OCCMED (OUTPATIENT)
Dept: URGENT CARE | Age: 50
End: 2020-11-01
Payer: OTHER MISCELLANEOUS

## 2020-11-01 ENCOUNTER — APPOINTMENT (OUTPATIENT)
Dept: RADIOLOGY | Age: 50
End: 2020-11-01
Payer: OTHER MISCELLANEOUS

## 2020-11-01 DIAGNOSIS — M25.512 ACUTE PAIN OF LEFT SHOULDER: Primary | ICD-10-CM

## 2020-11-01 DIAGNOSIS — M25.512 ACUTE PAIN OF LEFT SHOULDER: ICD-10-CM

## 2020-11-01 PROCEDURE — G0383 LEV 4 HOSP TYPE B ED VISIT: HCPCS | Performed by: NURSE PRACTITIONER

## 2020-11-01 PROCEDURE — 73030 X-RAY EXAM OF SHOULDER: CPT

## 2020-11-01 PROCEDURE — 99284 EMERGENCY DEPT VISIT MOD MDM: CPT | Performed by: NURSE PRACTITIONER

## 2020-11-05 ENCOUNTER — APPOINTMENT (OUTPATIENT)
Dept: URGENT CARE | Age: 50
End: 2020-11-05
Payer: OTHER MISCELLANEOUS

## 2020-11-05 PROCEDURE — 99213 OFFICE O/P EST LOW 20 MIN: CPT | Performed by: NURSE PRACTITIONER

## 2020-11-16 ENCOUNTER — APPOINTMENT (OUTPATIENT)
Dept: URGENT CARE | Age: 50
End: 2020-11-16
Payer: OTHER MISCELLANEOUS

## 2020-11-16 PROCEDURE — 99213 OFFICE O/P EST LOW 20 MIN: CPT | Performed by: PHYSICIAN ASSISTANT

## 2020-11-25 ENCOUNTER — APPOINTMENT (OUTPATIENT)
Dept: URGENT CARE | Age: 50
End: 2020-11-25
Payer: OTHER MISCELLANEOUS

## 2020-11-25 PROCEDURE — 99213 OFFICE O/P EST LOW 20 MIN: CPT | Performed by: PREVENTIVE MEDICINE

## 2020-12-01 ENCOUNTER — APPOINTMENT (OUTPATIENT)
Dept: URGENT CARE | Age: 50
End: 2020-12-01
Payer: OTHER MISCELLANEOUS

## 2020-12-01 PROCEDURE — 99213 OFFICE O/P EST LOW 20 MIN: CPT | Performed by: PHYSICIAN ASSISTANT

## 2020-12-10 ENCOUNTER — OFFICE VISIT (OUTPATIENT)
Dept: OBGYN CLINIC | Facility: OTHER | Age: 50
End: 2020-12-10
Payer: OTHER MISCELLANEOUS

## 2020-12-10 VITALS
HEART RATE: 98 BPM | SYSTOLIC BLOOD PRESSURE: 163 MMHG | HEIGHT: 66 IN | BODY MASS INDEX: 42.91 KG/M2 | DIASTOLIC BLOOD PRESSURE: 84 MMHG | WEIGHT: 267 LBS

## 2020-12-10 DIAGNOSIS — M75.02 ADHESIVE CAPSULITIS OF LEFT SHOULDER: Primary | ICD-10-CM

## 2020-12-10 DIAGNOSIS — M25.512 ACUTE PAIN OF LEFT SHOULDER: ICD-10-CM

## 2020-12-10 PROCEDURE — 99203 OFFICE O/P NEW LOW 30 MIN: CPT | Performed by: ORTHOPAEDIC SURGERY

## 2020-12-10 RX ORDER — AMLODIPINE BESYLATE 10 MG/1
TABLET ORAL
COMMUNITY

## 2020-12-10 RX ORDER — BENZONATATE 100 MG/1
CAPSULE ORAL
COMMUNITY

## 2020-12-10 RX ORDER — ALBUTEROL SULFATE 90 UG/1
AEROSOL, METERED RESPIRATORY (INHALATION)
COMMUNITY

## 2020-12-10 RX ORDER — INSULIN GLARGINE 300 U/ML
INJECTION, SOLUTION SUBCUTANEOUS
COMMUNITY
Start: 2020-09-24

## 2020-12-10 RX ORDER — FLUTICASONE PROPIONATE 50 MCG
SPRAY, SUSPENSION (ML) NASAL
COMMUNITY

## 2020-12-10 RX ORDER — INSULIN ASPART 100 [IU]/ML
INJECTION, SOLUTION INTRAVENOUS; SUBCUTANEOUS
COMMUNITY

## 2020-12-10 RX ORDER — NAPROXEN 500 MG/1
500 TABLET ORAL 2 TIMES DAILY WITH MEALS
COMMUNITY

## 2020-12-10 RX ORDER — CHLORTHALIDONE 50 MG/1
TABLET ORAL
COMMUNITY
Start: 2020-08-11

## 2020-12-10 RX ORDER — ATORVASTATIN CALCIUM 40 MG/1
40 TABLET, FILM COATED ORAL DAILY
COMMUNITY
Start: 2020-11-12 | End: 2021-11-12

## 2020-12-11 ENCOUNTER — TELEPHONE (OUTPATIENT)
Dept: OBGYN CLINIC | Facility: HOSPITAL | Age: 50
End: 2020-12-11

## 2020-12-11 DIAGNOSIS — M75.02 ADHESIVE CAPSULITIS OF LEFT SHOULDER: Primary | ICD-10-CM

## 2020-12-28 ENCOUNTER — APPOINTMENT (OUTPATIENT)
Dept: URGENT CARE | Age: 50
End: 2020-12-28
Payer: OTHER MISCELLANEOUS

## 2020-12-28 PROCEDURE — 99213 OFFICE O/P EST LOW 20 MIN: CPT | Performed by: PHYSICIAN ASSISTANT

## 2020-12-30 ENCOUNTER — TELEPHONE (OUTPATIENT)
Dept: OBGYN CLINIC | Facility: HOSPITAL | Age: 50
End: 2020-12-30

## 2021-01-22 ENCOUNTER — TELEPHONE (OUTPATIENT)
Dept: OBGYN CLINIC | Facility: HOSPITAL | Age: 51
End: 2021-01-22

## 2021-01-22 NOTE — TELEPHONE ENCOUNTER
Patient Sees Dr Bety Leal from [facility] called to see if we received a fax from them

## 2021-03-11 ENCOUNTER — OFFICE VISIT (OUTPATIENT)
Dept: OBGYN CLINIC | Facility: OTHER | Age: 51
End: 2021-03-11
Payer: OTHER MISCELLANEOUS

## 2021-03-11 VITALS
DIASTOLIC BLOOD PRESSURE: 83 MMHG | SYSTOLIC BLOOD PRESSURE: 160 MMHG | BODY MASS INDEX: 44.13 KG/M2 | HEART RATE: 91 BPM | HEIGHT: 66 IN | WEIGHT: 274.6 LBS

## 2021-03-11 DIAGNOSIS — M75.42 IMPINGEMENT SYNDROME OF LEFT SHOULDER: Primary | ICD-10-CM

## 2021-03-11 DIAGNOSIS — M75.02 ADHESIVE CAPSULITIS OF LEFT SHOULDER: ICD-10-CM

## 2021-03-11 PROCEDURE — 99213 OFFICE O/P EST LOW 20 MIN: CPT | Performed by: PHYSICIAN ASSISTANT

## 2021-03-11 PROCEDURE — 20610 DRAIN/INJ JOINT/BURSA W/O US: CPT | Performed by: PHYSICIAN ASSISTANT

## 2021-03-11 RX ORDER — BUPIVACAINE HYDROCHLORIDE 2.5 MG/ML
2 INJECTION, SOLUTION INFILTRATION; PERINEURAL
Status: COMPLETED | OUTPATIENT
Start: 2021-03-11 | End: 2021-03-11

## 2021-03-11 RX ORDER — BETAMETHASONE SODIUM PHOSPHATE AND BETAMETHASONE ACETATE 3; 3 MG/ML; MG/ML
6 INJECTION, SUSPENSION INTRA-ARTICULAR; INTRALESIONAL; INTRAMUSCULAR; SOFT TISSUE
Status: COMPLETED | OUTPATIENT
Start: 2021-03-11 | End: 2021-03-11

## 2021-03-11 RX ADMIN — BETAMETHASONE SODIUM PHOSPHATE AND BETAMETHASONE ACETATE 6 MG: 3; 3 INJECTION, SUSPENSION INTRA-ARTICULAR; INTRALESIONAL; INTRAMUSCULAR; SOFT TISSUE at 12:52

## 2021-03-11 RX ADMIN — BUPIVACAINE HYDROCHLORIDE 2 ML: 2.5 INJECTION, SOLUTION INFILTRATION; PERINEURAL at 12:52

## 2021-03-11 NOTE — PATIENT INSTRUCTIONS

## 2021-03-11 NOTE — LETTER
March 11, 2021     Patient: Rena Dalal   YOB: 1970   Date of Visit: 3/11/2021       To Whom it May Concern:    Pebbles Blanca is under my professional care  She was seen in my office on 3/11/2021  She may return to work with limitations : no lifting, pushing or pulling more 8 lbs  Next evaluation in 2 months  Restrictions will not change prior to next evaluation  If you have any questions or concerns, please don't hesitate to call           Sincerely,          Poly Ramirez PA-C        CC: No Recipients

## 2021-03-11 NOTE — PROGRESS NOTES
Assessment  Diagnoses and all orders for this visit:    Impingement syndrome of left shoulder  -     Ambulatory referral to Occupational Therapy; Future    Adhesive capsulitis of left shoulder - resolving        Discussion and Plan:    1  Referral to Occupational therapy for impingement syndrome  She will be doing this at work  2  Subacromial steroid injection - left shoulder  Patience Martinez is diabetic and will closely monitor her blood glucose over next 24-48 hrs  3  Follow up in 2 months  4  Tylenol PRN pain  5  Ice if needed for pain  6  Work note provided - 8 lbs    I did discuss treatment options with Patience Martinez  Per her MRI report, she does not have a rotator cuff tear; however, MRI was a 0 23 Ivon and images were of poor quality  If symptoms persist at next visit, obtaining another MRI of better quality should be considered  Subjective:   Patient ID: Mine Galeano is a 45 y o  female      Patience Martinez presents to the office in follow up of her work related left shoulder injury  She was seeing occupational therapy at work which continued for about a month after her initial eval   She has been compliant with her HEP  Therapy worked only on motion as instructed  She continues to have lateral based left shoulder pain  Pain is worse with motion  Pain improves with rest   Pain interferes with work and with sleep  She denies improvement with OTC medication  Patience Martinez brought her MRI today for review  Denies new injury or trauma since last visit  The following portions of the patient's history were reviewed and updated as appropriate: allergies, current medications, past family history, past medical history, past social history, past surgical history and problem list     Review of Systems   Constitutional: Negative for chills and fever  HENT: Negative for hearing loss  Eyes: Negative for visual disturbance  Respiratory: Negative for shortness of breath  Cardiovascular: Negative for chest pain  Gastrointestinal: Negative for abdominal pain  Musculoskeletal:        As reviewed in the HPI   Skin: Negative for rash  Neurological:        As reviewed in the HPI   Psychiatric/Behavioral: Negative for agitation  Objective:  /83 (BP Location: Right arm, Patient Position: Sitting, Cuff Size: Adult)   Pulse 91   Ht 5' 6" (1 676 m)   Wt 125 kg (274 lb 9 6 oz)   BMI 44 32 kg/m²       Left Shoulder Exam     Tenderness   The patient is experiencing tenderness in the acromioclavicular joint  Range of Motion   Passive abduction:  90 normal   External rotation: 50   Forward flexion: 170 (painful)   Internal rotation 0 degrees: Lumbar     Muscle Strength   Left shoulder normal muscle strength: painful but good strength  External rotation: 5/5   Supraspinatus: 5/5     Tests   Crooks test: positive  Cross arm: positive  Impingement: positive    Other   Erythema: absent  Scars: present (healed arthroscopic portals)  Sensation: normal  Pulse: present         Large joint arthrocentesis: L subacromial bursa  Universal Protocol:  Consent: Verbal consent obtained  Consent given by: patient    Supporting Documentation  Indications: pain   Procedure Details  Location: shoulder - L subacromial bursa  Preparation: Patient was prepped and draped in the usual sterile fashion  Needle size: 22 G  Approach: lateral  Medications administered: 6 mg betamethasone acetate-betamethasone sodium phosphate 6 (3-3) mg/mL; 2 mL bupivacaine 0 25 %    Patient tolerance: patient tolerated the procedure well with no immediate complications  Dressing:  Sterile dressing applied          Physical Exam  Constitutional:       Appearance: She is well-developed  She is obese  HENT:      Head: Normocephalic  Neck:      Musculoskeletal: Normal range of motion  Pulmonary:      Breath sounds: Normal breath sounds  No wheezing  Skin:     General: Skin is warm and dry     Neurological:      Mental Status: She is alert and oriented to person, place, and time  Psychiatric:         Behavior: Behavior normal          Thought Content: Thought content normal          Judgment: Judgment normal              I have personally reviewed pertinent films in PACS and my interpretation is as follows  MRI - images of poor quality unable to interpret    Per read - no rotator cuff tear, prominent subacromial and subdeltoid bursitis

## 2021-03-25 ENCOUNTER — TELEPHONE (OUTPATIENT)
Dept: OBGYN CLINIC | Facility: HOSPITAL | Age: 51
End: 2021-03-25

## 2021-03-25 NOTE — TELEPHONE ENCOUNTER
Minesh sees Dr Saul Archibald is calling in from [facility] wanting to know if the OT evaluation for signature was received, he is going to refax it over to us

## 2021-03-26 NOTE — TELEPHONE ENCOUNTER
Giovanna Rather from [facility] calling again to find out if we received his fax  Nothing new in Media  Giovanna Rather will be stopping by to drop off the OT evaluation for signature

## 2021-04-21 NOTE — TELEPHONE ENCOUNTER
Yoli Oar states OT eval form was distorted that was sent back to him, Please resend to alternate fax [phone]    Any questions call, ph [phone]

## 2021-05-06 ENCOUNTER — OFFICE VISIT (OUTPATIENT)
Dept: OBGYN CLINIC | Facility: OTHER | Age: 51
End: 2021-05-06
Payer: OTHER MISCELLANEOUS

## 2021-05-06 VITALS
DIASTOLIC BLOOD PRESSURE: 82 MMHG | WEIGHT: 276 LBS | SYSTOLIC BLOOD PRESSURE: 156 MMHG | BODY MASS INDEX: 44.36 KG/M2 | HEIGHT: 66 IN | HEART RATE: 101 BPM

## 2021-05-06 DIAGNOSIS — M75.42 IMPINGEMENT SYNDROME OF LEFT SHOULDER: Primary | ICD-10-CM

## 2021-05-06 DIAGNOSIS — M75.02 ADHESIVE CAPSULITIS OF LEFT SHOULDER: ICD-10-CM

## 2021-05-06 PROCEDURE — 99213 OFFICE O/P EST LOW 20 MIN: CPT | Performed by: PHYSICIAN ASSISTANT

## 2021-05-06 NOTE — LETTER
May 6, 2021     Patient: Meng Newell   YOB: 1970   Date of Visit: 5/6/2021       To Whom it May Concern:    Elisha Roca is under my professional care  She was seen in my office on 5/6/2021  She may return to light duty max lifting, pushing, and pulling of 10 lbs  If you have any questions or concerns, please don't hesitate to call           Sincerely,          Kris Paula PA-C        CC: No Recipients

## 2021-05-06 NOTE — PROGRESS NOTES
Assessment  Diagnoses and all orders for this visit:    Impingement syndrome of left shoulder    Adhesive capsulitis of left shoulder          Discussion and Plan:    The patient has an examination consistent with improving but persistent adhesive capsulitis  I have discussed with the patient the pathophysiology of this diagnosis and reviewed how the examination correlates with this diagnosis  Patient was instructed to continue physical therapy  It was explained to the patient that PT should not be doing any strengthening and should instead only be focused on stretching  Advised she may want to consider going to outpatient therapy as she is receiving therapy at work if she feels they continue to provide her with strengthening exercises despite our recommendation  Patient may return to work with a 10 lbs restriction of lifting pushing or pulling left upper extremity  Follow up 3 mos  If more detail work restrictions are required or more to frequent follow-up is required then the patient should be returned to occupational medicine to manage these requirements  A  was present with the patient and will discuss this with the patient's  and make a decision as to whether not the 3 month follow-up is appropriate or whether sooner follow-up with occupational medicine is desired  finally we again touched on her MRI scan did not show obvious pathology but was of very poor quality and so if she does resolve her adhesive capsulitis and demonstrate full range of motion but continues to have symptoms such as weakness or pain then a new better quality MRI should be considered to rule out any other underlying pathology, currently that is not indicated as her examination is consistent with adhesive capsulitis    Subjective:   Patient ID: Елена Arreola is a 45 y o  female      HPI    Patient presents today for follow up of left shoulder impingement and adhesive capsulitis    She received an injection at her last visit on 3/11/21  Patient has been attending therapy at work  She reports she they have been using therabands  She continues to have lateral based left shoulder pain  Pain is worse with motion  Pain improves with rest   Pain interferes with work and with sleep  The following portions of the patient's history were reviewed and updated as appropriate: allergies, current medications, past family history, past medical history, past social history, past surgical history and problem list     Review of Systems   Constitutional: Negative for chills and fever  HENT: Negative for drooling and sneezing  Eyes: Negative for redness  Respiratory: Negative for cough and wheezing  Gastrointestinal: Negative for nausea and vomiting  Musculoskeletal:        Please see ortho exam   Psychiatric/Behavioral: Negative for behavioral problems  The patient is not nervous/anxious  Objective:  /82   Pulse 101   Ht 5' 6" (1 676 m)   Wt 125 kg (276 lb)   BMI 44 55 kg/m²       Left Shoulder Exam     Range of Motion   Passive abduction: 80   External rotation: 50   Forward flexion: 170   Internal rotation 0 degrees: Lumbar     Muscle Strength   Abduction: 5/5   External rotation: 5/5     Tests   Crooks test: positive  Impingement: positive    Other   Erythema: absent  Sensation: normal  Pulse: present             Physical Exam  Vitals signs reviewed  Constitutional:       Appearance: She is well-developed  Eyes:      Pupils: Pupils are equal, round, and reactive to light  Pulmonary:      Effort: Pulmonary effort is normal       Breath sounds: Normal breath sounds  Skin:     General: Skin is warm and dry  Neurological:      Mental Status: She is alert and oriented to person, place, and time  Psychiatric:         Behavior: Behavior normal          Thought Content:  Thought content normal          Judgment: Judgment normal            Scribe Attestation    I,:  Arby Galeazzi am acting as a scribe while in the presence of the attending physician :       I,:  Maribel Arellano MD personally performed the services described in this documentation    as scribed in my presence :

## 2021-11-11 ENCOUNTER — OFFICE VISIT (OUTPATIENT)
Dept: OBGYN CLINIC | Facility: OTHER | Age: 51
End: 2021-11-11
Payer: OTHER MISCELLANEOUS

## 2021-11-11 VITALS
SYSTOLIC BLOOD PRESSURE: 110 MMHG | BODY MASS INDEX: 44.03 KG/M2 | HEART RATE: 108 BPM | WEIGHT: 274 LBS | HEIGHT: 66 IN | DIASTOLIC BLOOD PRESSURE: 71 MMHG

## 2021-11-11 DIAGNOSIS — M75.02 ADHESIVE CAPSULITIS OF LEFT SHOULDER: Primary | ICD-10-CM

## 2021-11-11 DIAGNOSIS — M75.42 IMPINGEMENT SYNDROME OF LEFT SHOULDER: ICD-10-CM

## 2021-11-11 PROCEDURE — 99213 OFFICE O/P EST LOW 20 MIN: CPT | Performed by: ORTHOPAEDIC SURGERY

## 2021-11-11 RX ORDER — SIMVASTATIN 40 MG
TABLET ORAL
COMMUNITY

## 2021-11-11 RX ORDER — LEVOTHYROXINE SODIUM 0.2 MG/1
200 TABLET ORAL DAILY
COMMUNITY
Start: 2021-09-27 | End: 2022-09-27

## 2021-11-11 RX ORDER — LOSARTAN POTASSIUM 50 MG/1
TABLET ORAL
COMMUNITY
Start: 2021-09-29

## 2021-11-11 RX ORDER — INSULIN LISPRO 100 [IU]/ML
INJECTION, SOLUTION INTRAVENOUS; SUBCUTANEOUS
COMMUNITY
Start: 2021-10-13

## 2021-11-11 RX ORDER — ERGOCALCIFEROL 1.25 MG/1
50000 CAPSULE ORAL WEEKLY
COMMUNITY
Start: 2021-09-29

## 2021-11-11 RX ORDER — OXYCODONE AND ACETAMINOPHEN 7.5; 325 MG/1; MG/1
TABLET ORAL
COMMUNITY
Start: 2021-10-18

## 2021-11-11 RX ORDER — SEMAGLUTIDE 1.34 MG/ML
INJECTION, SOLUTION SUBCUTANEOUS
COMMUNITY

## 2023-05-08 ENCOUNTER — VBI (OUTPATIENT)
Dept: ADMINISTRATIVE | Facility: OTHER | Age: 53
End: 2023-05-08